# Patient Record
Sex: MALE | Race: WHITE | URBAN - METROPOLITAN AREA
[De-identification: names, ages, dates, MRNs, and addresses within clinical notes are randomized per-mention and may not be internally consistent; named-entity substitution may affect disease eponyms.]

---

## 2017-03-06 ENCOUNTER — TRANSFERRED RECORDS (OUTPATIENT)
Dept: HEALTH INFORMATION MANAGEMENT | Facility: CLINIC | Age: 62
End: 2017-03-06

## 2017-03-17 ENCOUNTER — BEH TREATMENT PLAN (OUTPATIENT)
Dept: BEHAVIORAL HEALTH | Facility: CLINIC | Age: 62
End: 2017-03-17

## 2017-03-17 ENCOUNTER — HOSPITAL ENCOUNTER (OUTPATIENT)
Dept: BEHAVIORAL HEALTH | Facility: CLINIC | Age: 62
Discharge: HOME | End: 2017-03-17
Attending: PSYCHOLOGIST | Admitting: PSYCHOLOGIST
Payer: MEDICARE

## 2017-03-17 PROCEDURE — 90791 PSYCH DIAGNOSTIC EVALUATION: CPT

## 2017-03-17 RX ORDER — DILTIAZEM HYDROCHLORIDE 240 MG/1
240 CAPSULE, EXTENDED RELEASE ORAL DAILY
COMMUNITY

## 2017-03-17 ASSESSMENT — PAIN SCALES - GENERAL: PAINLEVEL: SEVERE PAIN (6)

## 2017-03-17 NOTE — PROGRESS NOTES
"Standard Diagnostic Assessment     CLIENT'S NAME: Elbert Hi  MRN:   [id number]  :   1955 AGE:62 year old SEX: male  ACCT. NUMBER: [id number]  DATE OF SERVICE: 3/17/17 Start Time:  11:20 End Time:  12:30      Home Phone [phone]   Work Phone Not on file.   Mobile Not on file.     Preferred Phone: [phone]  May we leave a program related message? yes    Yes, the patient has been informed that any other mental health professional providing mental health services to me will need access to this Diagnostic Assessment in order to develop a treatment plan and receive payment.     Identifying Information:  Elbert Hi is a 62 year old, White,  male. Elbert attended the   alone.     Reason for Referral: Elbert was referred to 55+ Outpatient Program (55+) by therapist. Elbert reports the reason for referral at this time is \"I just don't want to do anything. I go out to breakfast every morning but that's about all I do except go on the computer or watch TV.\"    Elbert verbalizes the following treatment/discharge goals: \"Increase my interest. Have energy and desire\".    Current Stressors/Losses/Disappointments:   Finances - retired social security and a small pension.  No other stressors indicated.    Per Client, Review of Symptoms:  Mood (Depression/Anxiety/Maura/Anger):  Depression, low self-esteem, low interest  Thoughts: distracted thoughts   Concentration/Memory: poor short term memory - depends on what   Appetite/Weight: (see also, Physical Health Screening below) increase in appetite, gain   Sleep: restless    Motivation/Energy: slowed down physical activity, not enough motivation, low energy  Behavior: less talkative, binge eating - a long time habit, some unrestrained shopping sprees - spent more money this morning at [employer] than intended     Psychosis: none  Trauma: no trauma indicated   Other: none    Mental Health History:  Elbert reports first onset of mental health symptoms  " "- going through divorce at the time and started having depression.  Wife had been bipolar and had schizophrenia -  three years.  Elbert was first diagnosed depression  Elbert received the following mental health services in the past: counseling, physician / PCP and medication(s) from physician / PCP.   Psychiatric Hospitalizations: None.   Elbert denies a history of civil commitment.      Onset/Duration/Pattern of Symptoms noted above: \"Symptoms since August of 2009 when I stopped working. Nothing is getting better.\"    Elbert reports the following understanding of his diagnosis: depression      Personal Safety:    Are you depressed or being treated for depression? yes   Have you ever thought about hurting yourself (SIB) now or in the past? no     Have you ever thought about suicide now or in the past? No     Do you have a gun, weapons or other means (including medications) to harm yourself available to you? No   Have any of your family members or friends attempted or completed suicide? (If yes, Who, When, How) yes - there was a kid I worked with years ago. He took an overdose.     Do you take chances with your safety?   no   Have you currently or in the past had trouble with physical aggression (If yes, describe)? no     Have you ever thought about killing someone else? No   Have you ever heard voices? No       Supports:   From whom do you receive support? (family/friends/agency) Not really anybody - talk with the guys with breakfast, but not deep. I don't want to burden anybody     How often do you have contact with them? N/A - breakfast group daily     Do your support people want/need education/resources? no        Is there anything in your life (current or history) that is satisfying to you (include leisure interests/hobbies)?   yes slot car racing on weekends- haven't abhishek able to do it lately because of knee and hip. Has to stand to do it. Last did it a month and a half ago.      Hope/Belief System:  Do you " "think things can get better? No  \"There is no magic wand.\"      Rate how strongly you believe things can get better:   (Scale 1-5; 1=no belief; 5=Very Strong Belief)    3    What would make it better?  Doesn't really    What gives you hope?    \"I can't think of anything. I feel plateued on that.       Personal Safety Summary:  After gathering the above information, Elbert  presents the following high risk factors for suicide: Male Hopelessness, Worthlessness.  Elbert denies current fears or concerns for personal safety.    Elbert has the following Protective Factors: Reality testing ability and Positive problem-solving skills States he has never endorsed any suicidal thoughts     Upon review of the patient interview and identification of high risk factors determine individualized safety strategies alternatives and treatment plan interventions. A Safety plan was not completed due to a history of no suicidal thinking or intent     Substance Use History:     Substance: Hx of Use/Abuse: Last Use: Pattern of Use:   Alcohol yes 1984    Cannabis yes Mid 70s    Street Drugs no     Prescription Drugs no     Other no       Substance Use Disorder Treatment: Elbert is currently receiving the following services: No indications of CD issues.       CAGE-AID:  Have you ever felt you ought to cut down on your drinking or drug use?   No    Have people annoyed you by criticizing your drinking or drug use?   No    Have you ever felt bad or guilty about your drinking or drug use?   No    Have you ever had a drink or used drugs first thing in the morning to steady your nerves or to get rid of a hangover?  No    Do you feel these issues have been adequately addressed?   Yes. How? No indications of CD issues    Chemical Dependency Assessment Recommended?  No        Elbert has a negative Cage-Aid score.     Legal History:    Elbert reports that he has not been involved with the legal system. " "  ________________________________________________________________________    Life Situation (Employment/School/Finances/Basic Needs):  Elbert  is currently living alone in a renting a house. Rents house from sister in law  The safety/stability of this environment is described as: safe and stable -  The steps are concerning when doing laundry since he feels unsteady on stairs    Elbert is currently retired: for 7-1/2 years  Elbert describes a work Hx of    Elbert reports finances are obtained through pension, SSDI  Elbert does identify his finances as a current stressor.  Elbert denies a history of gambling and denies a history of gambling treatment.     Elbert reports his highest level of education is associate degree / vocational certificate 4 years trade school Elbert did not identify any learning problems   Elbert describes academic performance as: \"so-so\"   Elbert describes school social experience as: 'It was ok\"     Elbert denies concerns regarding his current ability to meet basic needs. Cleaning housing is difficult - haven't cleaned in five years. Uses paper plates - I don't use dishes - wipes things down and reuses them.    Social/Family History:  Elbert  reports he grew up in [place], [place].   Elbert was the first born of 4 children. Three brothers  Elbert reports his biological parents are  Mom is 82 and lives in nursing home for MS, Dad is 84. Dad lives in an apartment  They live in [place].    Elbert describes his childhood as \"Happy and ok\"  Elbert describes his current relationships with his family of origin as Parents - ok, Brothers - when we see it's ok. Sees brothers as a group 1-2 times per year, connects with one brother 5-6 times per year, another one 8-9 times per year, the third brother lives in [place] so doesn't see too often.     Elbert identifies his relationship status as: .  for three years in late 1990s.    Elbert identifies his sexual orientation as: opposite sex   " "Elbert denies sexual health concerns.     Elbert reports having 0 children.     Elbert describes the quantity/quality of his social relationships as \"Breakfast Men's group, Once per month gets together for breakfast with a group on friends from high school.\"        Significant Losses / Trauma / Abuse / Neglect Issues / Developmental Incidents:  Elbert denies significant loss/trauma/abuse/neglect issues/developmental incidents   Elbert reports N/A   Elbert has not addressed the above concerns in previous therapy/treatment N/A    Elbert denies personal  experience.     Jew Preference/Spiritual Beliefs/Cultural Considerations:    Not Yarsanism or spiritual    A. Ethnic Self-Identification:  Elbert self-identifies his race/ethnicities as:  and his preferred language to be English.   Elbert reports he does not need the assistance of an . Elbert  reports he does not need other support or modifications involved in therapy.      B. Do you experience cultural bias (the practice of interpreting judging behavior by standards inherent to one's own culture) by other people as a stressor? If yes, describe how this relates to overall mental health symptoms.  Yes - describe:  Everytime I hear about these manic radicals I feel rage.    C. Are there any cultural influences that may need to be considered for your treatment?  (This includes historical, geographical and familial factors that affect assessment and intervention processes). No, Denies any cultural influences or concerns that need to be considered for treatment    Strengths/Vulnerabilities:   Elbert identifies his personal strengths as: caring, committed to sobriety, creative, educated, empathetic, good listener, has a previous history of therapy, intelligent, open to learning, open to suggestions / feedback, support of family, friends and providers, supportive and work history .   Things that may interfere with the clients success in treatment " include: few friends, financial hardship, lack of family support and lack of social support.   Other identified areas of vulnerability include: Depressive symptoms  Physical/medical.     Medical History / Physical Health Screen:     Primary Care Physician: Elbert has a non-[facility] Primary Care Provider. Their PCP is Tequila Brannon..   Last Physical Exam: greater than a year ago and client was encouraged to schedule an exam with PCP.  Has one scheduled in 10 days  Mental Health Medication Management Provider / Psychiatrist: Elbert reports not having a psychiatrist.     Last visit:         Next visit:     Current medications including prescription, non-prescription, herbals, dietary aids and vitamins:  Per client report:   Outpatient Prescriptions Marked as Taking for the 3/17/17 encounter (Hospital Encounter) with Sylvia Ramos LICSW   Medication Sig     VITAMIN D, CHOLECALCIFEROL, PO Take 2,000 Units by mouth daily     diltiazem (TIAZAC) 240 MG 24 hr ER beaded capsule Take 240 mg by mouth daily     Doxazosin Mesylate (CARDURA PO) Take 4 mg by mouth     FLUoxetine HCl (PROZAC PO) Take 40 mg by mouth daily     FUROSEMIDE PO Take 40 mg by mouth 1/2 tablet     HYDRALAZINE HCL PO Take 50 mg by mouth 3 times daily     insulin glulisine (APIDRA) 100 UNIT/ML injection Inject Subcutaneous 3 times daily (with meals) And with small snack     LEVOTHYROXINE SODIUM PO Take 200 mcg by mouth Take 200mcg daily 6 days per week, 100mcg on tuesdays     NAPROXEN PO Take 500 mg by mouth 2 times daily (with meals)     Oxybutynin Chloride (DITROPAN XL PO) Take 10 mg by mouth daily     Pantoprazole Sodium (PROTONIX PO) Take 20 mg by mouth     WARFARIN SODIUM PO Take 7 mg by mouth Take 14mg M,W,F and take 13mg all other days as directed. Use with a 2mg tablet to achieve dose     ASPIRIN 81 MG OR CPEP 1 CAPSULE DAILY     LIPITOR 20 MG OR TABS 1 TABLET DAILY     LISINOPRIL 40mg QD     TRAZODONE HCL 300mg qhs     METFORMIN  "HCL 1000 MG OR TABS 1 TABLET TWICE DAILY WITH FOOD     NITROGLYCERIN 0.4 MG SL SUBL 1 TAB EVERY 5 MIN AS NEEDED, UP TO 3 PER EPISODE     VITAMIN B-50 TABS   OR .5 tablet daily       Elbert reports current medications are: Not effective: \"I don't notice any difference. I keep trying different doses and just have the blah's\".   Elbert describes taking his medications as: Independent.  Elbert reports taking prescribed medications as prescribed.     Elbert provides the following current assessment of pain: Pain Loc:  (right hip and knee ); Pain Score: Severe Pain (6);  .     Elbert provides the following information regarding past significant medical conditions/diagnoses:      Medical:  Past Medical History   Diagnosis Date     Arthritis      Cancer (H)      thyroid - yesterday found nodes     Cerebral infarction (H)      Depressive disorder      Diabetes (H)      Heart disease      Thyroid disease        Surgical:  Past Surgical History   Procedure Laterality Date     Biopsy       Cardiac surgery       bypass - 2006     Colonoscopy       Abdomen surgery       bowel surgery - 2006     Orthopedic surgery       thumb surgery     Undescended testicle at age 16       Allergy:   Elbert reports   Allergies   Allergen Reactions     Cefprozil      cefzil     Indocin [Indomethacin]      dizziness     Tramadol      Lightheaded        Family History of Medical, Mental Health and/or Substance Use problems:  Per client report: History reviewed. No pertinent family history.    Elbert reports no current medical concerns.  Hip and leg pain    General Health:   Have you had any exposure to any communicable disease in the past 2-3 weeks? no     Are you aware of safe sex practices? yes     Is there a possibility of pregnancy?  no       Nutrition:    Are you on a special diet? If yes, please explain:  no   Do you have any concerns regarding your nutritional status? If yes, please explain:  No Its terrible but I'm not concerned about it   Have you " had any appetite changes in the last 3 months?  No     Have you had any weight loss or weight gain in the last 3 months?  No     Do you have a history of an eating disorder? no   Do you have a history of being in an eating disorder program? no   NOTE: BMI to be calculated following program admission.    Fall Risk:   Have you had any falls in the past 3 months? Yes - fell down last two basement steps     Do you currently useany assistive devices for mobility?   no     NOTE: If client reports 3 or more falls in the past 3 months, the client will not be accepted into the program until further assessment is completed by the program nurse. Check if a nurse is available to assess at time of DA.    NOTE: If client reports 2 falls in the past 3 months and/or the client currently uses assistive devices for mobility, the  will send an in-basket to the program nurse to meet with the client within the first week of programming.    Head Injury/Trauma:   Do you have a history of head injury / trauma? no     Do you have any cognitive impairment? no       Per completion of the Medical History / Physical Health Screen, is there a recommendation to see / follow up with a primary care physician/clinic?    Yes, Recommendations:   physical exam      Clinical Findings     Mental Status Assessment/Clinical Observation:  Appearance:   awake, alert, adequately groomed and appeared as age stated  Eye Contact:   good  Psychomotor Behavior: Normal  intact station, gait and muscle tone  Attitude:   Cooperative    Oriented to:   All    Speech   Rate / Production: Normal    Volume:  Normal   Mood:    Normal    Affect:    Appropriate      Thought Content:  Clear  no evidence of suicidal ideation or homicidal ideation and no evidence of psychotic thought  Thought Form:  logical, linear and goal oriented no loose associations  Insight:    fair    Judgment:     fair  Attention Span/Concentration: fair  Recent and Remote Memory:   fair      Psychiatric Diagnosis:    296.32 Major Depressive Disorder, Recurrent Episode, Moderate _    Provisional Diagnostic Hypothesis (Explain R/O, other Provisional Diagnosis, and why alternative Diagnosis that were considered were ruled out):   Deferred    Medical Concerns that may Impact Treatment:   None - uable to stand for long due to right hip and knee pain    Psychosocial and Contextual Factors (V-Codes):  V60.2 Low income stress with finances adn at times overspending and V62.9 Unspecified problem related to unspecified psychosocial circumstances limited social support. He does have a group of men with whom he goes to breakfast daily, but does not want to burden them    WHODAS 2.0 SCORE: 32/95 %      Client and family participation in assessment:   Elbert was alone during this assessment.   This assessment does include collateral information. Therapy notes from [facility]     Summary & Recommendations  Provide a brief summary of how diagnostic criteria is met (symptoms, duration & functional impairment), cause, prognosis, and likely consequences of symptoms. Include overview of pertinent client strengths, cultural influences, life situations, relationships, health concerns and how diagnosis interacts/impacts with client's life. Recommendations include: client preferences, prioritization of needed mental health, ancillary or other services and any referrals to services required by statute or rule.     Elbert is a 62 year old  male who was referred to the 55+ Program by his individual therapist at [facility]. He has been involved in therapy since May 2016 and has made some progress. However, the therapist recommends an IOP program in order to add more structure, socialization, and coping skills to his daily life. Elbert currently endorses a depressed mood, limited interest in any activities of interest, limited motivation and energy to cook and clean. He states he has had these symptoms for the  past 5 or more years and it isn't changing or getting better. He is working on the goals of decreasing his debt and seeking out social support with his therapist.     Elbert was  for a three year period in the late 1990s. He did seek out therapy following his divorce. This was the first time he was involved in any mental health help. He states he was  to a woman who was severely mentally ill and he had to take care of her children. Elbert is retired, but he used to work as a . He ws laid off of work at age 54, looked for employment for two years, and then applied for disability. He now supports himself on SSDI and a pension. He is the oldest of four, all sons. His parents are still living in [place]. He has occasional contact with his brothers. He goes to a men's breakfast most days of the week, and then returns home for the rest of the day and either watches TV or is on the Internet. He does not cook and is not eating healthy, even though it impacts his general health and diabetes. He does not clean his home. He says he hasn't dusted or vacuumed in 5 years. He does not do dishes, but mostly eats off of paper plates so he does not have to clean. He also has a group of friends who race slot cars on the weekends through the winter months. He was looking forward to this, but hasn't been able to participate due to hip and knee pain (not able to stand). He does not talk to his friends about his depression because he does not want to burden them. Elbert is the oldest and has three brothers. He talks occasionally to his brothers, but does not see them on a regular basis. His parents are living in [place], [place]. His mother has been in a nursing home for 5 years.     Elbert's therapist and primary care provider are at [facility]. He did not have a record of medication with him at the time of the assessment. The medications listed were pulled from the [facility] assessment  "completed in May 2016 so there may be some changes since then. Will be requesting an updated medication list from his Primary Care Provider, Dr. Isabella Reinoso from [facility]. Therapy records indicate some resistance to making changes. He did state he had concern about talking in a group setting, but was willing to \"give it a try\". He is interested in participating in the 55+ program in order to increase energy, and interest in activities and life. He will drive himself to the program.       Prognosis is Fair. Without the recommended intervention, the client is likely to experience the following consequences of their symptoms: Increase in symptoms, decrease in overall daily functioning with possibility of requiring a higher level of care and intervention, including hospitalization.    Referrals to services required by statute or rule:   Report to child/adult protection services was NA.   Referral to another professional/service is not indicated at this time..    Program Recommendation: 55+ Outpatient Program (55+).      Assessment Completed by: RYLIE Tan    "

## 2017-03-17 NOTE — PROGRESS NOTES
"Initial Individual Treatment Plan     Patient: Elbert Hi   MRN: [id number]  : 1955  Age: 62 year old  Sex: male    Diagnostic Assessment Date / Date of Initial Individual Treatment Plan: 3/17/17      Immediate Health Concerns:  No. Per history, see safety plan.     Immediate Safety Concerns:  No    Identify the issues to be addressed in treatment:  Symptom Management, Personal Safety, Community Resources/Discharge Planning, Develop / Improve Independent Living Skills, Develop Socialization / Interpersonal Relationship Skills and Physical Health     Client Initial Individualized Goals for Treatment: Increase my interest. Have energy and desire to do things.\"    Initial Treatment suggestions for the client during the time between Diagnostic Assessment and completion of the Individualized Treatment Plan:  Ask for more information, support and/or assistance as needed.  Follow up with providers/community supports as needed: primary care doctor and therapist  Report increases or changes in symptoms to staff.  Report any personal safety concerns to staff.   Take medications as prescribed.  Report medication changes and/or side effects to staff.  Attend and participate in groups as scheduled or notify staff if unable to do so.  Report any use of substances to staff as this may impact your symptoms and/or  personal safety.  Notify staff if you have any other issues that need to be addressed. This may include  any current abuse / neglect / exploitation or other vulnerability.  Follow recommendations of your treatment team and discuss concerns if not in  agreement.     Treatment Team Responsible: 55+ Outpatient Program (55+)     Therapeutic Interventions/Treatment Strategies may include:  Support, Redirection, Feedback, Limit/Boundaries, Safety Assessments, Structured Activity, Problem Solving, Clarification, Education, Motivational Enhancement and Relapse Prevention as needed.    Sylvia Ramos, " LICSW

## 2017-03-17 NOTE — PROGRESS NOTES
Acknowledgement of Current Treatment Plan       I have reviewed my treatment plan with my therapist / counselor on 3/17/2017. I agree with the plan as it is written in the electronic health record.    Name Signature   Elbert Hi    Name of Therapist / Counselor    RYLIE Tan

## 2017-03-20 ENCOUNTER — TELEPHONE (OUTPATIENT)
Dept: BEHAVIORAL HEALTH | Facility: CLINIC | Age: 62
End: 2017-03-20

## 2017-03-20 NOTE — TELEPHONE ENCOUNTER
Writer called Elbert to discuss a start date for the program. The 55+ program was recommending participation in the C track (T,F). Elbert said he had hearfd from his doctor later on Friday, 3/17, that the polyps he had scanned on Thursday were malignant for cancer. He has some more appointments scheduled later this week. He would like to place his start date on hold and give himself time to reflect on his health and next steps. He agreed to call this writer later next week.

## 2017-05-12 ENCOUNTER — TELEPHONE (OUTPATIENT)
Dept: BEHAVIORAL HEALTH | Facility: CLINIC | Age: 62
End: 2017-05-12

## 2017-05-12 NOTE — TELEPHONE ENCOUNTER
Writer will close out Elbert's outpatient IOP chart as of today, 5/12/17. He has not been in contact with the program since 3/20/17.